# Patient Record
Sex: FEMALE | Race: WHITE
[De-identification: names, ages, dates, MRNs, and addresses within clinical notes are randomized per-mention and may not be internally consistent; named-entity substitution may affect disease eponyms.]

---

## 2019-11-15 ENCOUNTER — HOSPITAL ENCOUNTER (EMERGENCY)
Dept: HOSPITAL 92 - ERS | Age: 29
Discharge: HOME | End: 2019-11-15
Payer: SELF-PAY

## 2019-11-15 DIAGNOSIS — S91.312A: Primary | ICD-10-CM
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
## 2019-11-15 DIAGNOSIS — F17.210: ICD-10-CM

## 2019-11-15 PROCEDURE — 90471 IMMUNIZATION ADMIN: CPT

## 2019-11-15 PROCEDURE — 12002 RPR S/N/AX/GEN/TRNK2.6-7.5CM: CPT

## 2019-11-15 PROCEDURE — 90715 TDAP VACCINE 7 YRS/> IM: CPT

## 2019-11-15 NOTE — RAD
XR Foot Lt 3 View STANDARD



INDICATION: Stepped on a yardstick



COMPARISON: None.



FINDINGS:



Bones: No acute fracture identified.



Joints: Joints spaces appear preserved.



Lisfranc alignment: Lisfranc alignment appears within normal limits.



Soft tissues: No soft tissue injury demonstrated. No radiographic foreign body demonstrated.



IMPRESSION: No acute osseous abnormality.



Reported By: Ap Hickman 

Electronically Signed:  11/15/2019 2:39 PM

## 2019-11-26 ENCOUNTER — HOSPITAL ENCOUNTER (EMERGENCY)
Dept: HOSPITAL 92 - ERS | Age: 29
Discharge: HOME | End: 2019-11-26
Payer: SELF-PAY

## 2019-11-26 DIAGNOSIS — S91.312D: Primary | ICD-10-CM

## 2019-11-26 DIAGNOSIS — F17.210: ICD-10-CM

## 2019-11-26 PROCEDURE — 99282 EMERGENCY DEPT VISIT SF MDM: CPT

## 2019-12-02 ENCOUNTER — HOSPITAL ENCOUNTER (EMERGENCY)
Dept: HOSPITAL 92 - ERS | Age: 29
Discharge: HOME | End: 2019-12-02
Payer: COMMERCIAL

## 2019-12-02 DIAGNOSIS — Z79.899: ICD-10-CM
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
## 2019-12-02 DIAGNOSIS — S91.312D: Primary | ICD-10-CM

## 2019-12-02 DIAGNOSIS — F17.210: ICD-10-CM

## 2019-12-02 PROCEDURE — 99283 EMERGENCY DEPT VISIT LOW MDM: CPT

## 2019-12-02 PROCEDURE — 36416 COLLJ CAPILLARY BLOOD SPEC: CPT

## 2019-12-17 ENCOUNTER — HOSPITAL ENCOUNTER (EMERGENCY)
Dept: HOSPITAL 92 - ERS | Age: 29
End: 2019-12-17
Payer: SELF-PAY
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
## 2019-12-17 DIAGNOSIS — F17.210: ICD-10-CM

## 2019-12-17 DIAGNOSIS — S91.312D: Primary | ICD-10-CM
